# Patient Record
Sex: MALE | Race: WHITE | Employment: OTHER | URBAN - METROPOLITAN AREA
[De-identification: names, ages, dates, MRNs, and addresses within clinical notes are randomized per-mention and may not be internally consistent; named-entity substitution may affect disease eponyms.]

---

## 2017-01-09 ENCOUNTER — OFFICE VISIT (OUTPATIENT)
Dept: INTERNAL MEDICINE CLINIC | Facility: CLINIC | Age: 67
End: 2017-01-09

## 2017-01-09 VITALS
RESPIRATION RATE: 14 BRPM | BODY MASS INDEX: 31.33 KG/M2 | HEART RATE: 55 BPM | WEIGHT: 252 LBS | SYSTOLIC BLOOD PRESSURE: 136 MMHG | OXYGEN SATURATION: 98 % | DIASTOLIC BLOOD PRESSURE: 86 MMHG | HEIGHT: 75 IN

## 2017-01-09 DIAGNOSIS — I10 ESSENTIAL HYPERTENSION, BENIGN: Primary | ICD-10-CM

## 2017-01-09 PROCEDURE — 99213 OFFICE O/P EST LOW 20 MIN: CPT | Performed by: INTERNAL MEDICINE

## 2017-01-09 RX ORDER — LOSARTAN POTASSIUM 25 MG/1
TABLET ORAL
Qty: 30 TABLET | Refills: 5 | Status: SHIPPED | OUTPATIENT
Start: 2017-01-09 | End: 2017-08-07

## 2017-01-09 NOTE — PROGRESS NOTES
Thi Rizo  10/5/1950 is a 78year old male.     Patient presents with:  Blood Pressure: f/up        HPI:   bp ck     Current Outpatient Prescriptions:  Econazole Nitrate 1 % External Cream Apply to feet twice a day for 3 weeks Disp: 30 g Rfl: 2   Los visit.     The patient indicates understanding of these issues and agrees to the plan. The patient is asked to Return in about 6 months (around 7/9/2017). Ariadne Medina MD

## 2017-06-15 ENCOUNTER — MA CHART PREP (OUTPATIENT)
Dept: FAMILY MEDICINE CLINIC | Facility: CLINIC | Age: 67
End: 2017-06-15

## 2017-06-16 ENCOUNTER — OFFICE VISIT (OUTPATIENT)
Dept: INTERNAL MEDICINE CLINIC | Facility: CLINIC | Age: 67
End: 2017-06-16

## 2017-06-16 VITALS
HEIGHT: 75 IN | BODY MASS INDEX: 29.59 KG/M2 | RESPIRATION RATE: 17 BRPM | DIASTOLIC BLOOD PRESSURE: 76 MMHG | HEART RATE: 58 BPM | WEIGHT: 238 LBS | TEMPERATURE: 98 F | SYSTOLIC BLOOD PRESSURE: 120 MMHG | OXYGEN SATURATION: 98 %

## 2017-06-16 DIAGNOSIS — I10 ESSENTIAL HYPERTENSION, BENIGN: ICD-10-CM

## 2017-06-16 DIAGNOSIS — Z00.00 ROUTINE GENERAL MEDICAL EXAMINATION AT A HEALTH CARE FACILITY: Primary | ICD-10-CM

## 2017-06-16 PROCEDURE — 84436 ASSAY OF TOTAL THYROXINE: CPT | Performed by: INTERNAL MEDICINE

## 2017-06-16 PROCEDURE — 84443 ASSAY THYROID STIM HORMONE: CPT | Performed by: INTERNAL MEDICINE

## 2017-06-16 PROCEDURE — 96160 PT-FOCUSED HLTH RISK ASSMT: CPT | Performed by: INTERNAL MEDICINE

## 2017-06-16 PROCEDURE — 80061 LIPID PANEL: CPT | Performed by: INTERNAL MEDICINE

## 2017-06-16 PROCEDURE — G0439 PPPS, SUBSEQ VISIT: HCPCS | Performed by: INTERNAL MEDICINE

## 2017-06-16 PROCEDURE — 84153 ASSAY OF PSA TOTAL: CPT | Performed by: INTERNAL MEDICINE

## 2017-06-16 PROCEDURE — 81003 URINALYSIS AUTO W/O SCOPE: CPT | Performed by: INTERNAL MEDICINE

## 2017-06-16 PROCEDURE — 83036 HEMOGLOBIN GLYCOSYLATED A1C: CPT | Performed by: INTERNAL MEDICINE

## 2017-06-16 PROCEDURE — 80053 COMPREHEN METABOLIC PANEL: CPT | Performed by: INTERNAL MEDICINE

## 2017-06-16 PROCEDURE — 93000 ELECTROCARDIOGRAM COMPLETE: CPT | Performed by: INTERNAL MEDICINE

## 2017-06-16 PROCEDURE — 85025 COMPLETE CBC W/AUTO DIFF WBC: CPT | Performed by: INTERNAL MEDICINE

## 2017-06-16 NOTE — PATIENT INSTRUCTIONS
Aayush Slade's SCREENING SCHEDULE   Tests on this list are recommended by your physician but may not be covered, or covered at this frequency, by your insurer. Please check with your insurance carrier before scheduling to verify coverage.

## 2017-06-16 NOTE — PROGRESS NOTES
Rosalina Temple is a 78year old male who presents for a Medicare Annual Wellness visit.    male     Patient Care Team: Patient Care Team:  Joanie Murphy MD as PCP - General (Internal Medicine)    Patient Active Problem List:     Essential hypertension, be help    Dressing: Able without help    Eating: Able without help    Driving: Able without help    Preparing your meals: Able without help    Managing money/bills: Able without help    Taking medications as prescribed: Able without help    Are you able to a Date Value   06/26/2015 5.5    No flowsheet data found.     Fasting Blood Sugar (FSB)Annually   GLUCOSE (mg/dL)   Date Value   08/07/2015 95   ----------    Cardiovascular Disease Screening     LDL Annually LDL-CHOLESTEROL (mg/dL (calc))   Date Value   06 Annually HEMOGLOBIN A1C (% of total Hgb)   Date Value   06/26/2015 5.5    No flowsheet data found. Creat/alb ratio  Annually      LDL  Annually LDL-CHOLESTEROL (mg/dL (calc))   Date Value   06/26/2015 121    No flowsheet data found.      Dilated Eye exam vision, no redness , no drainage. Ears no earaches, no fullness, normal hearing, no tinnitus. Nose and Sinuses no recurrent colds, no stuffiness, no discharge, no hay fever, no nosebleeds, no sinus trouble. Mouth and Pharynx no sore throats, no hoarseness. (Oral)  Resp 17  Ht 75\"  Wt 238 lb  BMI 29.75 kg/m2  SpO2 98%     > BP Readings from Last 3 Encounters:  06/16/17 : 120/76  01/09/17 : 136/86  11/11/16 : 122/78      GENERAL: well developed, well nourished, in no apparent distress  SKIN: no rashes, no jaime extremity joints: normal.   Upper extremity joints: normal.   NEUROLOGICAL:    Cognitive function  Mood /affect -thought :perception :normal  Appearance-orientation normal  Thought normal   Babinski: negative. .   Cerebellar Testing grossly/intact: yes. Yuliana Spar

## 2017-06-19 ENCOUNTER — TELEPHONE (OUTPATIENT)
Dept: INTERNAL MEDICINE CLINIC | Facility: CLINIC | Age: 67
End: 2017-06-19

## 2017-06-19 DIAGNOSIS — I10 ESSENTIAL HYPERTENSION, BENIGN: Primary | ICD-10-CM

## 2017-08-07 ENCOUNTER — TELEPHONE (OUTPATIENT)
Dept: INTERNAL MEDICINE CLINIC | Facility: CLINIC | Age: 67
End: 2017-08-07

## 2017-08-07 DIAGNOSIS — L98.9 ARM SKIN LESION, RIGHT: Primary | ICD-10-CM

## 2017-08-07 RX ORDER — LOSARTAN POTASSIUM 25 MG/1
TABLET ORAL
Qty: 30 TABLET | Refills: 5 | Status: SHIPPED | OUTPATIENT
Start: 2017-08-07 | End: 2018-02-07

## 2017-08-07 NOTE — TELEPHONE ENCOUNTER
Pt called triage line and LM for nurse to return call. I called pt back and LMTCB. Awaiting call to assist pt.

## 2017-08-07 NOTE — TELEPHONE ENCOUNTER
Reason for the order/referral: seeking dermatologist   PCP: [email or web address] Dr. Robbie Alford   Refer to Provider (first and last name): seeking derm   Specialty: dermatology   Patient Insurance: Payor: [facility] MED ADV HMO BLUE / Plan:  007 [place] HMO-POS / Product Type: HM

## 2017-08-07 NOTE — TELEPHONE ENCOUNTER
Spoke with pt and he stated that he has lesion under R arm that he would like to be looked at. Referral entered and approved through pts insurance company in [address] [place] [address]. Pt notified.

## 2017-09-29 ENCOUNTER — APPOINTMENT (OUTPATIENT)
Dept: LAB | Age: 67
End: 2017-09-29
Attending: DERMATOLOGY
Payer: MEDICARE

## 2017-09-29 DIAGNOSIS — L82.1 OTHER SEBORRHEIC KERATOSIS: ICD-10-CM

## 2017-09-29 DIAGNOSIS — D48.5 NEOPLASM OF UNCERTAIN BEHAVIOR OF SKIN: ICD-10-CM

## 2017-09-29 PROCEDURE — 88305 TISSUE EXAM BY PATHOLOGIST: CPT

## 2018-02-08 RX ORDER — LOSARTAN POTASSIUM 25 MG/1
TABLET ORAL
Qty: 30 TABLET | Refills: 5 | Status: SHIPPED | OUTPATIENT
Start: 2018-02-08 | End: 2018-06-21

## 2018-03-05 ENCOUNTER — OFFICE VISIT (OUTPATIENT)
Dept: INTERNAL MEDICINE CLINIC | Facility: CLINIC | Age: 68
End: 2018-03-05

## 2018-03-05 VITALS
HEIGHT: 72.5 IN | HEART RATE: 60 BPM | WEIGHT: 246 LBS | TEMPERATURE: 98 F | SYSTOLIC BLOOD PRESSURE: 118 MMHG | DIASTOLIC BLOOD PRESSURE: 72 MMHG | BODY MASS INDEX: 32.96 KG/M2

## 2018-03-05 DIAGNOSIS — I10 ESSENTIAL HYPERTENSION, BENIGN: Primary | ICD-10-CM

## 2018-04-19 NOTE — TELEPHONE ENCOUNTER
Medication(s) to Refill:   Pending Prescriptions Disp Refills    ECONAZOLE NITRATE 1 % External Cream [Pharmacy Med Name: ECONAZOLE 1%        CRE] 30 g 2     Sig: APPLY TO FEET TWICE A DAY FOR 3 WEEKS         Reason for Medication Refill being sent to [facility]

## 2018-06-18 NOTE — TELEPHONE ENCOUNTER
[address] called requesting refill for:  LOSARTAN POTASSIUM 25 MG Oral Tab  [facility], [phone]

## 2018-06-21 RX ORDER — LOSARTAN POTASSIUM 25 MG/1
TABLET ORAL
Qty: 90 TABLET | Refills: 1 | Status: SHIPPED | OUTPATIENT
Start: 2018-06-21 | End: 2019-02-15

## 2018-06-25 ENCOUNTER — TELEPHONE (OUTPATIENT)
Dept: INTERNAL MEDICINE CLINIC | Facility: CLINIC | Age: 68
End: 2018-06-25

## 2018-06-25 NOTE — TELEPHONE ENCOUNTER
Patient called earlier in the day stating that [facility]/doctor's office keeps contacting him and requesting him to set up an appointment. He was confused because as he understands Dr. Oseas Campos is his doctor and has always been.   I called insu

## 2018-06-29 ENCOUNTER — OFFICE VISIT (OUTPATIENT)
Dept: INTERNAL MEDICINE CLINIC | Facility: CLINIC | Age: 68
End: 2018-06-29

## 2018-06-29 ENCOUNTER — LAB ENCOUNTER (OUTPATIENT)
Dept: LAB | Age: 68
End: 2018-06-29
Attending: INTERNAL MEDICINE
Payer: MEDICARE

## 2018-06-29 VITALS
HEIGHT: 71.5 IN | BODY MASS INDEX: 33.14 KG/M2 | SYSTOLIC BLOOD PRESSURE: 132 MMHG | RESPIRATION RATE: 16 BRPM | WEIGHT: 242 LBS | HEART RATE: 66 BPM | DIASTOLIC BLOOD PRESSURE: 76 MMHG | OXYGEN SATURATION: 97 % | TEMPERATURE: 98 F

## 2018-06-29 DIAGNOSIS — Z00.00 ROUTINE GENERAL MEDICAL EXAMINATION AT A HEALTH CARE FACILITY: ICD-10-CM

## 2018-06-29 DIAGNOSIS — I10 ESSENTIAL HYPERTENSION, BENIGN: ICD-10-CM

## 2018-06-29 DIAGNOSIS — Z00.00 ROUTINE GENERAL MEDICAL EXAMINATION AT A HEALTH CARE FACILITY: Primary | ICD-10-CM

## 2018-06-29 PROBLEM — D69.6 THROMBOCYTOPENIA (HCC): Chronic | Status: ACTIVE | Noted: 2018-06-29

## 2018-06-29 LAB
ALBUMIN SERPL-MCNC: 4 G/DL (ref 3.5–4.8)
ALP LIVER SERPL-CCNC: 61 U/L (ref 45–117)
ALT SERPL-CCNC: 47 U/L (ref 17–63)
AST SERPL-CCNC: 29 U/L (ref 15–41)
BASOPHILS # BLD AUTO: 0.04 X10(3) UL (ref 0–0.1)
BASOPHILS NFR BLD AUTO: 0.8 %
BILIRUB SERPL-MCNC: 0.8 MG/DL (ref 0.1–2)
BILIRUB UR QL STRIP.AUTO: NEGATIVE
BUN BLD-MCNC: 26 MG/DL (ref 8–20)
CALCIUM BLD-MCNC: 8.8 MG/DL (ref 8.3–10.3)
CHLORIDE: 107 MMOL/L (ref 101–111)
CHOLEST SMN-MCNC: 157 MG/DL (ref ?–200)
CO2: 25 MMOL/L (ref 22–32)
COLOR UR AUTO: YELLOW
CREAT BLD-MCNC: 0.97 MG/DL (ref 0.7–1.3)
EOSINOPHIL # BLD AUTO: 0.19 X10(3) UL (ref 0–0.3)
EOSINOPHIL NFR BLD AUTO: 3.7 %
ERYTHROCYTE [DISTWIDTH] IN BLOOD BY AUTOMATED COUNT: 12.4 % (ref 11.5–16)
EST. AVERAGE GLUCOSE BLD GHB EST-MCNC: 111 MG/DL (ref 68–126)
GLUCOSE BLD-MCNC: 76 MG/DL (ref 70–99)
GLUCOSE UR STRIP.AUTO-MCNC: NEGATIVE MG/DL
HBA1C MFR BLD HPLC: 5.5 % (ref ?–5.7)
HCT VFR BLD AUTO: 46.3 % (ref 37–53)
HDLC SERPL-MCNC: 34 MG/DL (ref 45–?)
HDLC SERPL: 4.62 {RATIO} (ref ?–4.97)
HGB BLD-MCNC: 15.5 G/DL (ref 13–17)
IMMATURE GRANULOCYTE COUNT: 0.01 X10(3) UL (ref 0–1)
IMMATURE GRANULOCYTE RATIO %: 0.2 %
KETONES UR STRIP.AUTO-MCNC: NEGATIVE MG/DL
LDLC SERPL CALC-MCNC: 102 MG/DL (ref ?–130)
LEUKOCYTE ESTERASE UR QL STRIP.AUTO: NEGATIVE
LYMPHOCYTES # BLD AUTO: 1.07 X10(3) UL (ref 0.9–4)
LYMPHOCYTES NFR BLD AUTO: 21.1 %
M PROTEIN MFR SERPL ELPH: 7.7 G/DL (ref 6.1–8.3)
MCH RBC QN AUTO: 31.9 PG (ref 27–33.2)
MCHC RBC AUTO-ENTMCNC: 33.5 G/DL (ref 31–37)
MCV RBC AUTO: 95.3 FL (ref 80–99)
MONOCYTES # BLD AUTO: 0.64 X10(3) UL (ref 0.1–1)
MONOCYTES NFR BLD AUTO: 12.6 %
NEUTROPHIL ABS PRELIM: 3.12 X10 (3) UL (ref 1.3–6.7)
NEUTROPHILS # BLD AUTO: 3.12 X10(3) UL (ref 1.3–6.7)
NEUTROPHILS NFR BLD AUTO: 61.6 %
NITRITE UR QL STRIP.AUTO: NEGATIVE
NONHDLC SERPL-MCNC: 123 MG/DL (ref ?–130)
PH UR STRIP.AUTO: 5 [PH] (ref 4.5–8)
PLATELET # BLD AUTO: 136 10(3)UL (ref 150–450)
POTASSIUM SERPL-SCNC: 3.9 MMOL/L (ref 3.6–5.1)
PROT UR STRIP.AUTO-MCNC: NEGATIVE MG/DL
PSA SERPL-MCNC: 2.4 NG/ML (ref 0.01–4)
RBC # BLD AUTO: 4.86 X10(6)UL (ref 3.8–5.8)
RED CELL DISTRIBUTION WIDTH-SD: 43.4 FL (ref 35.1–46.3)
SODIUM SERPL-SCNC: 141 MMOL/L (ref 136–144)
SP GR UR STRIP.AUTO: 1.03 (ref 1–1.03)
THYROXINE (T4): 8.8 UG/DL (ref 4.5–10.9)
TRIGL SERPL-MCNC: 106 MG/DL (ref ?–150)
TSI SER-ACNC: 1.17 MIU/ML (ref 0.35–5.5)
UROBILINOGEN UR STRIP.AUTO-MCNC: 2 MG/DL
VLDLC SERPL CALC-MCNC: 21 MG/DL (ref 5–40)
WBC # BLD AUTO: 5.1 X10(3) UL (ref 4–13)

## 2018-06-29 PROCEDURE — 93000 ELECTROCARDIOGRAM COMPLETE: CPT | Performed by: INTERNAL MEDICINE

## 2018-06-29 PROCEDURE — 36415 COLL VENOUS BLD VENIPUNCTURE: CPT

## 2018-06-29 PROCEDURE — 84436 ASSAY OF TOTAL THYROXINE: CPT

## 2018-06-29 PROCEDURE — 85025 COMPLETE CBC W/AUTO DIFF WBC: CPT

## 2018-06-29 PROCEDURE — G0009 ADMIN PNEUMOCOCCAL VACCINE: HCPCS | Performed by: INTERNAL MEDICINE

## 2018-06-29 PROCEDURE — G0439 PPPS, SUBSEQ VISIT: HCPCS | Performed by: INTERNAL MEDICINE

## 2018-06-29 PROCEDURE — 84443 ASSAY THYROID STIM HORMONE: CPT

## 2018-06-29 PROCEDURE — 81001 URINALYSIS AUTO W/SCOPE: CPT

## 2018-06-29 PROCEDURE — 90670 PCV13 VACCINE IM: CPT | Performed by: INTERNAL MEDICINE

## 2018-06-29 PROCEDURE — 96160 PT-FOCUSED HLTH RISK ASSMT: CPT | Performed by: INTERNAL MEDICINE

## 2018-06-29 PROCEDURE — 80061 LIPID PANEL: CPT

## 2018-06-29 PROCEDURE — 84153 ASSAY OF PSA TOTAL: CPT

## 2018-06-29 PROCEDURE — 83036 HEMOGLOBIN GLYCOSYLATED A1C: CPT

## 2018-06-29 PROCEDURE — 80053 COMPREHEN METABOLIC PANEL: CPT

## 2018-06-29 NOTE — PROGRESS NOTES
REASON FOR VISIT:    Vicki Bravo is a 75year old male who presents for a Medicare Annual Wellness visit.     Male      Patient Care Team: Patient Care Team:  Bob Mead MD as PCP - General (Internal Medicine)    Patient Active Problem List:     Esse Ability     Bathing or Showering: Able without help  Toileting: Able without help  Dressing: Able without help  Eating: Able without help  Driving: Able without help  Preparing your meals: Able without help  Managing money/bills: Able without help  Taking on 10/14/2021    Flex Sigmoidoscopy Screen every 5 years No results found for this or any previous visit.     Fecal Occult Blood Annually No results found for: FOB, OCCULTSTOOL   Glaucoma Screening     Ophthalmology Visit Annually Advised    Immunizations Comment: 2 beers daily       REVIEW OF SYSTEMS:   General/Constitutional:   General able to do usual activities, good exercise tolerance, good general state of health, no fatigue, no fever, no weakness .    HEENT/Neck:   Head no dizziness, no lightheadedne memory loss, seizures, paresthesias, weakness. Tingling/numbness none. Trouble with balance none. Psychiatric:   Patient denies anxiety, depression, hallucinations. Insomnia none/no hx of sleep disorder. Memory loss none.      EXAM:   /76   Pulse 66 unremarkable. Prostate: mild enlargement   Testicles: unremarkable. EXTREMITIES:   Clubbing: none. Cyanosis: absent . Edema: none. Pulses: present. Tremors: no.   Varicose veins: absent .    MUSCULOSKELETAL:   Cervical spines: normal.   L-S spin Pneumococcal, Zoster, Tetanus   Influenza: There are no preventive care reminders to display for this patient.   Pneumonia: Pneumococcal vaccination(1 of 2 - PCV13) due on 10/05/2014  Shingrix shingles vaccine is due

## 2018-12-10 ENCOUNTER — TELEPHONE (OUTPATIENT)
Dept: INTERNAL MEDICINE CLINIC | Facility: CLINIC | Age: 68
End: 2018-12-10

## 2018-12-10 NOTE — TELEPHONE ENCOUNTER
Flu immunization received from [facility] - immunization record has been updated. Report placed in provider in box for signing.

## 2019-02-01 ENCOUNTER — TELEPHONE (OUTPATIENT)
Dept: INTERNAL MEDICINE CLINIC | Facility: CLINIC | Age: 69
End: 2019-02-01

## 2019-02-01 NOTE — TELEPHONE ENCOUNTER
Report received from [address] [place] [address] documenting that patient has received their shingrix vaccine. Immunizations have been updated.

## 2019-02-15 PROBLEM — B35.3 TINEA PEDIS OF BOTH FEET: Status: ACTIVE | Noted: 2019-02-15

## 2020-07-24 PROBLEM — D69.6 THROMBOCYTOPENIA (HCC): Chronic | Status: RESOLVED | Noted: 2018-06-29 | Resolved: 2020-07-24

## 2020-10-12 PROCEDURE — 88344 IMHCHEM/IMCYTCHM EA MLT ANTB: CPT | Performed by: UROLOGY

## 2020-10-12 PROCEDURE — 88305 TISSUE EXAM BY PATHOLOGIST: CPT | Performed by: UROLOGY

## 2021-06-14 PROBLEM — I70.0 ATHEROSCLEROSIS OF ABDOMINAL AORTA (HCC): Status: ACTIVE | Noted: 2021-06-14

## 2021-06-14 PROBLEM — C61 PROSTATE CANCER (HCC): Status: ACTIVE | Noted: 2021-06-14

## (undated) NOTE — MR AVS SNAPSHOT
[place]  [address] [facility]  [address] [facility] [address] [phone]               Thank you for choosing us for your health care visit with Almaz Richardson MD.  We are glad to serve you and happy to provide you with this bautista Reason for Today's Visit     Physical           Medical Issues Discussed Today     Essential hypertension, benign    Routine general medical examination at a health care facility    -  Primary      Instructions and Information about [address] [address] If you have questions, you can call [phone] to talk to our [facility] Staff. Remember, [employer] is NOT to be used for urgent needs. For medical emergencies, dial 911.         Educational Information     Healthy Diet and Regular Exercise  The [employer]ou

## (undated) NOTE — MR AVS SNAPSHOT
[place]  [facility]  [address] [facility] [address] [phone]               Thank you for choosing us for your health care visit with Zulma Valladares MD.  We are glad to serve you and happy to provide you with this bautista Your unique [employer] Access Code: [email or web address]  Expires: 3/10/2017  9:28 AM    If you have questions, you can call [phone] to talk to our [facility] Staff. Remember, [employer] is NOT to be used for urgent needs.   For medical emergencies, dial 911